# Patient Record
Sex: MALE | Race: WHITE | Employment: FULL TIME | URBAN - METROPOLITAN AREA
[De-identification: names, ages, dates, MRNs, and addresses within clinical notes are randomized per-mention and may not be internally consistent; named-entity substitution may affect disease eponyms.]

---

## 2022-08-22 ENCOUNTER — APPOINTMENT (OUTPATIENT)
Dept: ULTRASOUND IMAGING | Age: 32
End: 2022-08-22
Attending: EMERGENCY MEDICINE
Payer: COMMERCIAL

## 2022-08-22 ENCOUNTER — HOSPITAL ENCOUNTER (EMERGENCY)
Age: 32
Discharge: HOME | End: 2022-08-22
Attending: EMERGENCY MEDICINE | Admitting: EMERGENCY MEDICINE
Payer: COMMERCIAL

## 2022-08-22 VITALS
RESPIRATION RATE: 18 BRPM | WEIGHT: 195.53 LBS | SYSTOLIC BLOOD PRESSURE: 110 MMHG | OXYGEN SATURATION: 98 % | HEART RATE: 81 BPM | TEMPERATURE: 98.7 F | DIASTOLIC BLOOD PRESSURE: 68 MMHG

## 2022-08-22 DIAGNOSIS — M79.661 RIGHT CALF PAIN: Primary | ICD-10-CM

## 2022-08-22 PROCEDURE — 93971 EXTREMITY STUDY: CPT

## 2022-08-22 PROCEDURE — 99284 EMERGENCY DEPT VISIT MOD MDM: CPT | Performed by: EMERGENCY MEDICINE

## 2022-08-22 NOTE — ED TRIAGE NOTES
Triage Note: Patient arrives to ER complaining of right lower extrem pain for the past 5-6 days. Reports pain is worst in the morning and when he has not been mobile. Expresses he feels that his calve is swollen. Denies injury/trauma. No redness noted to the RLE. Trace amounts of Edema Noted.

## 2022-08-22 NOTE — ED PROVIDER NOTES
Ms. Heavenly Monson is a 35yo male who presents to the ER with complaints of right calf pain. He said that his symptoms started about 5 to 6 days ago. He has pain in his right calf that is constant. Is worse in the morning he gets up. Is also worse when he gets up and exerts himself. He thought he noticed some mild swelling of the back of his calf today. Therefore, he came to the ER with concerns for possible DVT. He denies chest pain or trouble breathing. He denies history of blood clots in the past.  No lightheadedness or dizziness. No nausea or vomiting. He denies any other trauma to his leg or any other complaints. Past Medical History:   Diagnosis Date    Asthma     Hypothyroidism     Neuropathy        History reviewed. No pertinent surgical history. History reviewed. No pertinent family history. Social History     Socioeconomic History    Marital status: SINGLE     Spouse name: Not on file    Number of children: Not on file    Years of education: Not on file    Highest education level: Not on file   Occupational History    Not on file   Tobacco Use    Smoking status: Every Day     Packs/day: 1.00     Years: 10.00     Pack years: 10.00     Types: Cigarettes    Smokeless tobacco: Never   Substance and Sexual Activity    Alcohol use: Not on file    Drug use: Not on file    Sexual activity: Not on file   Other Topics Concern    Not on file   Social History Narrative    Not on file     Social Determinants of Health     Financial Resource Strain: Not on file   Food Insecurity: Not on file   Transportation Needs: Not on file   Physical Activity: Not on file   Stress: Not on file   Social Connections: Not on file   Intimate Partner Violence: Not on file   Housing Stability: Not on file         ALLERGIES: Patient has no known allergies. Review of Systems   Constitutional:  Negative for chills and fever. HENT:  Negative for rhinorrhea and sore throat.     Respiratory:  Negative for cough and shortness of breath. Cardiovascular:  Negative for chest pain. Gastrointestinal:  Negative for abdominal pain, diarrhea, nausea and vomiting. Genitourinary:  Negative for dysuria and hematuria. Musculoskeletal:         Leg pain   Skin:  Negative for pallor and rash. Neurological:  Negative for dizziness, weakness and light-headedness. All other systems reviewed and are negative. Vitals:    08/22/22 1208 08/22/22 1214   BP: 131/83 128/79   Pulse: 81    Resp: 18    Temp: 98.7 °F (37.1 °C)    SpO2: 99% 99%   Weight: 88.7 kg (195 lb 8.4 oz)             Physical Exam       Vital signs reviewed. Nursing notes reviewed. Const:  No acute distress, well developed, well nourished  Head:  Atraumatic, normocephalic  Eyes:  PERRL, conjunctiva normal, no scleral icterus  Neck:  Supple, trachea midline  Cardiovascular: Regular rate  Resp:  No resp distress, no increased work of breathing  Abd:  Soft, non-tender, non-distended  MSK:  No pedal edema, normal ROM, minimal tenderness to the right calf with palpation, no appreciable swelling, no erythema  Neuro:  Alert and oriented x3, no cranial nerve defect  Skin:  Warm, dry, intact  Psych: normal mood and affect, behavior is normal, judgement and thought content is normal        MDM     Amount and/or Complexity of Data Reviewed  Clinical lab tests: ordered and reviewed  Tests in the radiology section of CPT®: ordered and reviewed  Review and summarize past medical records: yes    Patient Progress  Patient progress: stable          Mr. Tracy Rinne is a 35yo male who presents to the ER with complaints of calf pain. He is well appearing in the ER. No chest pain or respiratory symptoms. No DVT on ultrasound. No signs of infection. Sx do seem MSK. Pt. To follow-up with his PCP or return to the ER with new or worsening sx.       Procedures